# Patient Record
(demographics unavailable — no encounter records)

---

## 2024-11-12 NOTE — HISTORY OF PRESENT ILLNESS
[de-identified] : She comes in to check the BP which was elevated last time.  Her diet is healthy and she does some exercise.    The leg swelling is improved.

## 2024-11-12 NOTE — ASSESSMENT
[FreeTextEntry1] : The BP is borderline.  Restart HCTZ at 12.5 mg (had been stopped three years ago at the time of her surgery).  Check the BP and electrolytes in a month (hypokalemia in the past).   Discussed diet and exercise.  Note she is off Coreg due to bradycardia.  Lipids good on Rosuvastatin.  Mild leukopenia stable.    HgBA1c 5.9 stable.  She notes subjective B/L ear wetness.  No pain.  Exam ok.  She can see an ENT if bothersome.  She is seeing a gastroenterologist soon for the chronic GI symptoms.

## 2024-11-12 NOTE — PHYSICAL EXAM
[Normal] : normal rate, regular rhythm, normal S1 and S2 and no murmur heard [de-identified] : ears WNL B/L

## 2024-12-12 NOTE — ASSESSMENT
[FreeTextEntry1] : Functional change of bowel habit related to Meme-en-Y History of anastomotic stricture, serial dilations without any active complaints Screening, average risk  Indications risks benefits and alternatives to colonoscopy reviewed.  Patient agreeable  Patient referred by Dr. Bill Rod

## 2024-12-12 NOTE — REASON FOR VISIT
[Consultation] : a consultation visit [FreeTextEntry1] : Screening evaluation, change in bowel habit

## 2024-12-12 NOTE — HISTORY OF PRESENT ILLNESS
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  Flap reconstruction  Denies coronary disease congestive heart failure dysrhythmia diabetes

## 2024-12-23 NOTE — HISTORY OF PRESENT ILLNESS
[de-identified] : The patient comes to check the BP on HCTZ.  She's had no problems with it.    Her diet is ok.

## 2024-12-23 NOTE — HISTORY OF PRESENT ILLNESS
[de-identified] : The patient comes to check the BP on HCTZ.  She's had no problems with it.    Her diet is ok.

## 2024-12-23 NOTE — ASSESSMENT
[FreeTextEntry1] : The BP is very good now at 110/74 on HCTZ.  Continue and check a metabolic, check K and Mg.    Discussed diet and exercise.   She has had a few more cramps- monitor for now.    She saw her gastroenterologist, Dr. Oreilly.

## 2025-03-24 NOTE — ASSESSMENT
[FreeTextEntry1] : The BP is very good at 118/70.  Continue current meds.  Note that she is inconsistent with taking the HCTZ.  Check lipids on Rosuvastatin.  Discussed diet and exercise.  Monitor liver enzymes.  Check a B-12 and she takes B-12 shots at home.  Check a HgBA1c- has had previous DM.  She requests Semaglutide which is reasonable given her weight and DM.  Counseled regarding use.  DEXA due and advised.  Mammogram N/A.  She had a colonoscopy which was fine in 12/24.  New COVID-19 vaccine discussed.  S/p Shingrix.  No gyn now.  S/p LIAN.   The left leg pain might be radicular and she was advised to see a neurologist.    She will see her cardiologist after a year in 6/25.    Monitor leukopenia.

## 2025-03-24 NOTE — HISTORY OF PRESENT ILLNESS
[FreeTextEntry1] : The patient is here for a routine visit.  [de-identified] : She continues to have similar left leg pain which runs from the low back towards the lower leg and calf.  She is active and exercises.  No chest pain or dyspnea.  Diet is fairly good.

## 2025-03-24 NOTE — HEALTH RISK ASSESSMENT
[No falls in past year] : Patient reported no falls in the past year [0] : 2) Feeling down, depressed, or hopeless: Not at all (0) [[id number]] : 0 [Fully functional (bathing, dressing, toileting, transferring, walking, feeding)] : Fully functional (bathing, dressing, toileting, transferring, walking, feeding) [Fully functional (using the telephone, shopping, preparing meals, housekeeping, doing laundry, using] : Fully functional and needs no help or supervision to perform IADLs (using the telephone, shopping, preparing meals, housekeeping, doing laundry, using transportation, managing medications and managing finances) [Reports changes in hearing] : Reports no changes in hearing [Reports changes in vision] : Reports no changes in vision [Reports changes in dental health] : Reports no changes in dental health